# Patient Record
Sex: MALE | Race: NATIVE HAWAIIAN OR OTHER PACIFIC ISLANDER | NOT HISPANIC OR LATINO | Employment: FULL TIME | URBAN - METROPOLITAN AREA
[De-identification: names, ages, dates, MRNs, and addresses within clinical notes are randomized per-mention and may not be internally consistent; named-entity substitution may affect disease eponyms.]

---

## 2019-01-09 ENCOUNTER — HOSPITAL ENCOUNTER (OUTPATIENT)
Dept: LAB | Facility: MEDICAL CENTER | Age: 29
End: 2019-01-09
Attending: FAMILY MEDICINE

## 2019-01-09 LAB
ALBUMIN SERPL BCP-MCNC: 4.2 G/DL (ref 3.2–4.9)
ALBUMIN/GLOB SERPL: 1.3 G/DL
ALP SERPL-CCNC: 71 U/L (ref 30–99)
ALT SERPL-CCNC: 29 U/L (ref 2–50)
ANION GAP SERPL CALC-SCNC: 7 MMOL/L (ref 0–11.9)
AST SERPL-CCNC: 27 U/L (ref 12–45)
BASOPHILS # BLD AUTO: 0.8 % (ref 0–1.8)
BASOPHILS # BLD: 0.05 K/UL (ref 0–0.12)
BILIRUB SERPL-MCNC: 0.8 MG/DL (ref 0.1–1.5)
BUN SERPL-MCNC: 13 MG/DL (ref 8–22)
CALCIUM SERPL-MCNC: 9.8 MG/DL (ref 8.5–10.5)
CHLORIDE SERPL-SCNC: 105 MMOL/L (ref 96–112)
CHOLEST SERPL-MCNC: 161 MG/DL (ref 100–199)
CO2 SERPL-SCNC: 29 MMOL/L (ref 20–33)
CREAT SERPL-MCNC: 1.09 MG/DL (ref 0.5–1.4)
EOSINOPHIL # BLD AUTO: 0.22 K/UL (ref 0–0.51)
EOSINOPHIL NFR BLD: 3.7 % (ref 0–6.9)
ERYTHROCYTE [DISTWIDTH] IN BLOOD BY AUTOMATED COUNT: 40.5 FL (ref 35.9–50)
EST. AVERAGE GLUCOSE BLD GHB EST-MCNC: 126 MG/DL
FASTING STATUS PATIENT QL REPORTED: NORMAL
GLOBULIN SER CALC-MCNC: 3.3 G/DL (ref 1.9–3.5)
GLUCOSE SERPL-MCNC: 109 MG/DL (ref 65–99)
HBA1C MFR BLD: 6 % (ref 0–5.6)
HCT VFR BLD AUTO: 48.1 % (ref 42–52)
HDLC SERPL-MCNC: 37 MG/DL
HGB BLD-MCNC: 16.2 G/DL (ref 14–18)
IMM GRANULOCYTES # BLD AUTO: 0.02 K/UL (ref 0–0.11)
IMM GRANULOCYTES NFR BLD AUTO: 0.3 % (ref 0–0.9)
LDLC SERPL CALC-MCNC: 97 MG/DL
LYMPHOCYTES # BLD AUTO: 1.97 K/UL (ref 1–4.8)
LYMPHOCYTES NFR BLD: 33.3 % (ref 22–41)
MCH RBC QN AUTO: 30.6 PG (ref 27–33)
MCHC RBC AUTO-ENTMCNC: 33.7 G/DL (ref 33.7–35.3)
MCV RBC AUTO: 90.8 FL (ref 81.4–97.8)
MONOCYTES # BLD AUTO: 0.49 K/UL (ref 0–0.85)
MONOCYTES NFR BLD AUTO: 8.3 % (ref 0–13.4)
NEUTROPHILS # BLD AUTO: 3.16 K/UL (ref 1.82–7.42)
NEUTROPHILS NFR BLD: 53.6 % (ref 44–72)
NRBC # BLD AUTO: 0 K/UL
NRBC BLD-RTO: 0 /100 WBC
PLATELET # BLD AUTO: 303 K/UL (ref 164–446)
PMV BLD AUTO: 9.6 FL (ref 9–12.9)
POTASSIUM SERPL-SCNC: 3.9 MMOL/L (ref 3.6–5.5)
PROT SERPL-MCNC: 7.5 G/DL (ref 6–8.2)
RBC # BLD AUTO: 5.3 M/UL (ref 4.7–6.1)
SODIUM SERPL-SCNC: 141 MMOL/L (ref 135–145)
TRIGL SERPL-MCNC: 133 MG/DL (ref 0–149)
WBC # BLD AUTO: 5.9 K/UL (ref 4.8–10.8)

## 2019-01-09 PROCEDURE — 80061 LIPID PANEL: CPT

## 2019-01-09 PROCEDURE — 36415 COLL VENOUS BLD VENIPUNCTURE: CPT

## 2019-01-09 PROCEDURE — 85025 COMPLETE CBC W/AUTO DIFF WBC: CPT

## 2019-01-09 PROCEDURE — 83036 HEMOGLOBIN GLYCOSYLATED A1C: CPT

## 2019-01-09 PROCEDURE — 80053 COMPREHEN METABOLIC PANEL: CPT

## 2021-05-20 ENCOUNTER — HOSPITAL ENCOUNTER (OUTPATIENT)
Dept: CARDIOLOGY | Facility: MEDICAL CENTER | Age: 31
End: 2021-05-20
Attending: FAMILY MEDICINE

## 2021-05-20 LAB — EKG IMPRESSION: NORMAL

## 2021-05-20 PROCEDURE — 93010 ELECTROCARDIOGRAM REPORT: CPT | Performed by: INTERNAL MEDICINE

## 2021-05-20 PROCEDURE — 93005 ELECTROCARDIOGRAM TRACING: CPT

## 2022-05-15 ENCOUNTER — OFFICE VISIT (OUTPATIENT)
Dept: URGENT CARE | Facility: PHYSICIAN GROUP | Age: 32
End: 2022-05-15
Payer: COMMERCIAL

## 2022-05-15 VITALS
HEIGHT: 70 IN | OXYGEN SATURATION: 96 % | DIASTOLIC BLOOD PRESSURE: 80 MMHG | HEART RATE: 86 BPM | SYSTOLIC BLOOD PRESSURE: 124 MMHG | BODY MASS INDEX: 31.5 KG/M2 | WEIGHT: 220 LBS | TEMPERATURE: 98.4 F | RESPIRATION RATE: 18 BRPM

## 2022-05-15 DIAGNOSIS — R10.31 RIGHT GROIN PAIN: ICD-10-CM

## 2022-05-15 LAB
APPEARANCE UR: CLEAR
BILIRUB UR STRIP-MCNC: NEGATIVE MG/DL
COLOR UR AUTO: NORMAL
GLUCOSE UR STRIP.AUTO-MCNC: NEGATIVE MG/DL
KETONES UR STRIP.AUTO-MCNC: NORMAL MG/DL
LEUKOCYTE ESTERASE UR QL STRIP.AUTO: NEGATIVE
NITRITE UR QL STRIP.AUTO: NEGATIVE
PH UR STRIP.AUTO: 7 [PH] (ref 5–8)
PROT UR QL STRIP: 30 MG/DL
RBC UR QL AUTO: NEGATIVE
SP GR UR STRIP.AUTO: 1.02
UROBILINOGEN UR STRIP-MCNC: 0.2 MG/DL

## 2022-05-15 PROCEDURE — 81002 URINALYSIS NONAUTO W/O SCOPE: CPT | Performed by: PHYSICIAN ASSISTANT

## 2022-05-15 PROCEDURE — 99203 OFFICE O/P NEW LOW 30 MIN: CPT | Performed by: PHYSICIAN ASSISTANT

## 2022-05-15 RX ORDER — ACETAMINOPHEN 500 MG
500-1000 TABLET ORAL EVERY 6 HOURS PRN
COMMUNITY
End: 2024-03-01

## 2022-05-15 ASSESSMENT — ENCOUNTER SYMPTOMS
BACK PAIN: 0
CHANGE IN BOWEL HABIT: 0
CONSTIPATION: 0
BLOOD IN STOOL: 0
ABDOMINAL PAIN: 0
SORE THROAT: 0
FEVER: 0
VOMITING: 0
DIARRHEA: 0
FLANK PAIN: 0
ANOREXIA: 0
NAUSEA: 0
CHILLS: 0

## 2022-05-16 ENCOUNTER — HOSPITAL ENCOUNTER (OUTPATIENT)
Dept: RADIOLOGY | Facility: MEDICAL CENTER | Age: 32
End: 2022-05-16
Attending: PHYSICIAN ASSISTANT
Payer: COMMERCIAL

## 2022-05-16 DIAGNOSIS — R10.31 RIGHT GROIN PAIN: ICD-10-CM

## 2022-05-16 PROCEDURE — 76857 US EXAM PELVIC LIMITED: CPT

## 2022-05-16 NOTE — PROGRESS NOTES
Subjective     Leeann Powell is a 32 y.o. male who presents with Groin Pain (X 2 weeks with discomfort on (R) side)      Groin Pain  This is a new problem. The current episode started 1 to 4 weeks ago (Started 2 weeks ago). The problem occurs intermittently. The problem has been unchanged. Pertinent negatives include no abdominal pain, anorexia, change in bowel habit, chills, fever, nausea, sore throat, urinary symptoms or vomiting. Nothing aggravates the symptoms. He has tried nothing for the symptoms.   Patient reports that it is more of a sensation of discomfort rather than pain.  No urinary symptoms.  States that he is in a monogamous relationship and has no concern for STDs.  Pain does not radiate into his testicles.  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  Pain does not seem to get any worse with lifting.    Review of Systems   Constitutional: Negative for chills and fever.   HENT: Negative for sore throat.    Gastrointestinal: Negative for abdominal pain, anorexia, blood in stool, change in bowel habit, constipation, diarrhea, melena, nausea and vomiting.   Genitourinary: Negative for dysuria, flank pain, frequency, hematuria and urgency.        Right groin pain   Musculoskeletal: Negative for back pain.         PMH:  has a past medical history of Anxiety and Pneumonia.  MEDS:   Current Outpatient Medications:   •  acetaminophen (TYLENOL) 500 MG Tab, Take 500-1,000 mg by mouth as needed in the morning and 500-1,000 mg as needed at noon and 500-1,000 mg as needed in the evening and 500-1,000 mg as needed before bedtime., Disp: , Rfl:   ALLERGIES:   Allergies   Allergen Reactions   • Nkda [No Known Drug Allergy]      SURGHX: History reviewed. No pertinent surgical history.  SOCHX:  reports that he does not drink alcohol and does not use drugs.  FH: Family history was reviewed, no pertinent findings to  "report      Objective     /80 (BP Location: Left arm, Patient Position: Sitting, BP Cuff Size: Large adult)   Pulse 86   Temp 36.9 °C (98.4 °F) (Temporal)   Resp 18   Ht 1.778 m (5' 10\")   Wt 99.8 kg (220 lb)   SpO2 96%   BMI 31.57 kg/m²      Physical Exam  Exam conducted with a chaperone present.   Constitutional:       Appearance: Normal appearance. He is well-developed.   HENT:      Head: Normocephalic and atraumatic.      Right Ear: External ear normal.      Left Ear: External ear normal.   Eyes:      Conjunctiva/sclera: Conjunctivae normal.      Pupils: Pupils are equal, round, and reactive to light.   Cardiovascular:      Rate and Rhythm: Normal rate and regular rhythm.      Heart sounds: No murmur heard.  Pulmonary:      Effort: Pulmonary effort is normal.      Breath sounds: Normal breath sounds.   Abdominal:      Palpations: Abdomen is soft.      Tenderness: There is no abdominal tenderness. There is no right CVA tenderness or left CVA tenderness.      Hernia: There is no hernia in the left inguinal area or right inguinal area.   Genitourinary:     Penis: Normal.       Testes: Normal.         Right: Tenderness or swelling not present.         Left: Tenderness or swelling not present.      Epididymis:      Right: Normal.      Left: Normal.          Comments: Patient notes pain in the right groin region.  There is no overlying erythema or skin discoloration of any kind.  No noticeable swelling or bulge.  No obvious evidence of hernia on exam.  No tenderness with palpation of the area but patient did note some discomfort about 30 seconds after palpating that region.  Lymphadenopathy:      Lower Body: No right inguinal adenopathy. No left inguinal adenopathy.   Skin:     General: Skin is warm and dry.      Capillary Refill: Capillary refill takes less than 2 seconds.   Neurological:      Mental Status: He is alert and oriented to person, place, and time.   Psychiatric:         Behavior: Behavior " normal.         Judgment: Judgment normal.       POCT Urinalysis  Lab Results   Component Value Date/Time    POCCOLOR Smiley 05/15/2022 04:47 PM    POCAPPEAR Clear 05/15/2022 04:47 PM    POCLEUKEST Negative 05/15/2022 04:47 PM    POCNITRITE Negative 05/15/2022 04:47 PM    POCUROBILIGE 0.2 05/15/2022 04:47 PM    POCPROTEIN 30 05/15/2022 04:47 PM    POCURPH 7.0 05/15/2022 04:47 PM    POCBLOOD Negative 05/15/2022 04:47 PM    POCSPGRV 1.025 05/15/2022 04:47 PM    POCKETONES Trace 05/15/2022 04:47 PM    POCBILIRUBIN Negative 05/15/2022 04:47 PM    POCGLUCUA Negative 05/15/2022 04:47 PM        Assessment & Plan     1. Right groin pain  - US-INGUINAL HERNIA; Future  - POCT Urinalysis      Will obtain an ultrasound to further evaluate for possibility of inguinal hernia.  Ultrasound scheduled for tomorrow morning.  Will notify patient once I receive results.            Differential Diagnosis, natural history, and supportive care discussed. Return to the Urgent Care or follow up with your PCP if symptoms fail to resolve, or for any new or worsening symptoms. Emergency room precautions discussed. Patient and/or family appears understanding of information.

## 2022-06-13 ENCOUNTER — HOSPITAL ENCOUNTER (OUTPATIENT)
Dept: RADIOLOGY | Facility: MEDICAL CENTER | Age: 32
End: 2022-06-13
Attending: NURSE PRACTITIONER
Payer: COMMERCIAL

## 2022-06-13 ENCOUNTER — OFFICE VISIT (OUTPATIENT)
Dept: URGENT CARE | Facility: PHYSICIAN GROUP | Age: 32
End: 2022-06-13
Payer: COMMERCIAL

## 2022-06-13 VITALS
HEIGHT: 70 IN | SYSTOLIC BLOOD PRESSURE: 116 MMHG | DIASTOLIC BLOOD PRESSURE: 70 MMHG | BODY MASS INDEX: 31.5 KG/M2 | WEIGHT: 220 LBS | TEMPERATURE: 97.1 F | HEART RATE: 99 BPM | OXYGEN SATURATION: 97 % | RESPIRATION RATE: 16 BRPM

## 2022-06-13 DIAGNOSIS — N50.811 PAIN IN RIGHT TESTICLE: ICD-10-CM

## 2022-06-13 LAB
APPEARANCE UR: CLEAR
BILIRUB UR STRIP-MCNC: NEGATIVE MG/DL
COLOR UR AUTO: YELLOW
GLUCOSE UR STRIP.AUTO-MCNC: NEGATIVE MG/DL
KETONES UR STRIP.AUTO-MCNC: NEGATIVE MG/DL
LEUKOCYTE ESTERASE UR QL STRIP.AUTO: NEGATIVE
NITRITE UR QL STRIP.AUTO: NEGATIVE
PH UR STRIP.AUTO: 6.5 [PH] (ref 5–8)
PROT UR QL STRIP: NEGATIVE MG/DL
RBC UR QL AUTO: NEGATIVE
SP GR UR STRIP.AUTO: 1.01
UROBILINOGEN UR STRIP-MCNC: 0.2 MG/DL

## 2022-06-13 PROCEDURE — 76870 US EXAM SCROTUM: CPT

## 2022-06-13 PROCEDURE — 99213 OFFICE O/P EST LOW 20 MIN: CPT | Performed by: NURSE PRACTITIONER

## 2022-06-13 PROCEDURE — 81002 URINALYSIS NONAUTO W/O SCOPE: CPT | Performed by: NURSE PRACTITIONER

## 2022-06-13 ASSESSMENT — PAIN SCALES - GENERAL: PAINLEVEL: 4=SLIGHT-MODERATE PAIN

## 2022-09-20 ENCOUNTER — HOSPITAL ENCOUNTER (OUTPATIENT)
Dept: LAB | Facility: MEDICAL CENTER | Age: 32
End: 2022-09-20
Attending: UROLOGY
Payer: COMMERCIAL

## 2022-09-20 LAB — FSH SERPL-ACNC: 20.4 MIU/ML (ref 1.5–12.4)

## 2022-09-20 PROCEDURE — 88262 CHROMOSOME ANALYSIS 15-20: CPT

## 2022-09-20 PROCEDURE — 36415 COLL VENOUS BLD VENIPUNCTURE: CPT

## 2022-09-20 PROCEDURE — 83001 ASSAY OF GONADOTROPIN (FSH): CPT

## 2022-09-20 PROCEDURE — 84270 ASSAY OF SEX HORMONE GLOBUL: CPT

## 2022-09-20 PROCEDURE — 84402 ASSAY OF FREE TESTOSTERONE: CPT

## 2022-09-20 PROCEDURE — 88230 TISSUE CULTURE LYMPHOCYTE: CPT

## 2022-09-20 PROCEDURE — 84403 ASSAY OF TOTAL TESTOSTERONE: CPT

## 2022-09-20 PROCEDURE — 81403 MOPATH PROCEDURE LEVEL 4: CPT

## 2022-09-24 LAB
SHBG SERPL-SCNC: 15 NMOL/L (ref 17–56)
TESTOST FREE MFR SERPL: 2.4 % (ref 1.6–2.9)
TESTOST FREE SERPL-MCNC: 33 PG/ML (ref 47–244)
TESTOST SERPL-MCNC: 138 NG/DL (ref 300–1080)

## 2022-09-30 LAB
KARYOTYP BLD/T: NORMAL
PATHOLOGY STUDY: NORMAL

## 2022-10-15 LAB — TEST NAME 95000: NORMAL

## 2022-10-18 ENCOUNTER — HOSPITAL ENCOUNTER (OUTPATIENT)
Facility: MEDICAL CENTER | Age: 32
End: 2022-10-18
Attending: UROLOGY
Payer: COMMERCIAL

## 2022-10-18 PROCEDURE — 82306 VITAMIN D 25 HYDROXY: CPT

## 2022-10-18 PROCEDURE — 83002 ASSAY OF GONADOTROPIN (LH): CPT

## 2022-10-18 PROCEDURE — 82310 ASSAY OF CALCIUM: CPT

## 2022-10-19 LAB
25(OH)D3 SERPL-MCNC: 20 NG/ML (ref 30–100)
CALCIUM SERPL-MCNC: 9.6 MG/DL (ref 8.5–10.5)
LH SERPL-ACNC: 20 IU/L (ref 1.7–8.6)

## 2023-06-02 ENCOUNTER — OFFICE VISIT (OUTPATIENT)
Dept: URGENT CARE | Facility: PHYSICIAN GROUP | Age: 33
End: 2023-06-02
Payer: COMMERCIAL

## 2023-06-02 VITALS
HEART RATE: 72 BPM | BODY MASS INDEX: 31.35 KG/M2 | WEIGHT: 219 LBS | OXYGEN SATURATION: 97 % | DIASTOLIC BLOOD PRESSURE: 70 MMHG | SYSTOLIC BLOOD PRESSURE: 110 MMHG | RESPIRATION RATE: 18 BRPM | HEIGHT: 70 IN | TEMPERATURE: 97.3 F

## 2023-06-02 DIAGNOSIS — M25.531 RIGHT WRIST PAIN: ICD-10-CM

## 2023-06-02 PROCEDURE — 99213 OFFICE O/P EST LOW 20 MIN: CPT

## 2023-06-02 PROCEDURE — 3078F DIAST BP <80 MM HG: CPT

## 2023-06-02 PROCEDURE — 3074F SYST BP LT 130 MM HG: CPT

## 2023-06-03 PROBLEM — N50.819 PAIN IN TESTICLE: Status: ACTIVE | Noted: 2022-09-15

## 2023-06-03 PROBLEM — Q98.4 KLINEFELTER SYNDROME: Status: ACTIVE | Noted: 2023-04-03

## 2023-06-03 PROBLEM — N46.01 AZOOSPERMIA: Status: ACTIVE | Noted: 2022-09-15

## 2023-06-03 NOTE — PROGRESS NOTES
"Subjective:   Leeann Powell is a 33 y.o. male who presents for Wrist Injury (X 2 weeks, px in r wrist, popping sound when turning wrist, px when pulling on items)      HPI:    Patient presents to urgent care with concerns of persistent right wrist pain  Reports he injured the wrist two weeks ago while lifting heavy boxes   States he has a popping sound in his wrist when he turns it  He rates pain as 2-3 /10  Pain is aggravated with pulling and twisting maneuvers, wrist flexion.  Pain has been controlled with NSAIDs  Denies radiation of pain  Denies numbness/tingling sensation, weakness  He has been wearing a brace which has been helpful      ROS As above in HPI    Medications:    Current Outpatient Medications on File Prior to Visit   Medication Sig Dispense Refill    acetaminophen (TYLENOL) 500 MG Tab Take 500-1,000 mg by mouth as needed in the morning and 500-1,000 mg as needed at noon and 500-1,000 mg as needed in the evening and 500-1,000 mg as needed before bedtime.      clomiPHENE (CLOMID) 50 MG tablet clomiphene citrate 50 mg tablet   Take 0.5 tablets every day by oral route for 90 days.       No current facility-administered medications on file prior to visit.        Allergies:   Nkda [no known drug allergy]    Problem List:   Patient Active Problem List   Diagnosis    Azoospermia    Hypogonadism    Klinefelter syndrome    Pain in testicle        Surgical History:  No past surgical history on file.    Past Social Hx:   Social History     Tobacco Use    Smoking status: Never    Smokeless tobacco: Never   Vaping Use    Vaping Use: Never used   Substance Use Topics    Alcohol use: Yes    Drug use: No          Problem list, medications, and allergies reviewed by myself today in Epic.     Objective:     /70   Pulse 72   Temp 36.3 °C (97.3 °F) (Temporal)   Resp 18   Ht 1.778 m (5' 10\")   Wt 99.3 kg (219 lb)   SpO2 97%   BMI 31.42 kg/m²     Physical Exam  Vitals and nursing note reviewed. "   Constitutional:       Appearance: Normal appearance.   Cardiovascular:      Rate and Rhythm: Normal rate and regular rhythm.      Heart sounds: Normal heart sounds.   Pulmonary:      Effort: Pulmonary effort is normal.      Breath sounds: Normal breath sounds.   Musculoskeletal:         General: Tenderness present.      Comments: Dorsum of right medial wrist is tender to palpation. No obvious deformity, dislocation. No tenderness to right elbow, shoulder.  strength is 5/5. Pain is provoked with active and passive flexion of wrist, pronation/supination of forearm. Sensation is intact to light and sharp touch, cap refill is less than 2 seconds, 2+ radial pulse. No edema, erythema, warmth, fluctuance, ecchymosis. No rash.   Skin:     General: Skin is warm.      Capillary Refill: Capillary refill takes less than 2 seconds.   Neurological:      Mental Status: He is oriented to person, place, and time.         Assessment/Plan:     Diagnosis and associated orders:   1. Right wrist pain  - Referral to Sports Medicine       Comments/MDM:     Continue to wear brace, use NSAIDs/Tylenol per package instructions, application of ice packs as needed.   Referral to sports med placed due to persistent wrist pain  Imaging declined today  Follow up with PCP       Please note that this dictation was created using voice recognition software. I have made a reasonable attempt to correct obvious errors, but I expect that there are errors of grammar and possibly content that I did not discover before finalizing the note.    This note was electronically signed by TEMI Hendrickson

## 2023-06-14 SDOH — ECONOMIC STABILITY: TRANSPORTATION INSECURITY
IN THE PAST 12 MONTHS, HAS LACK OF RELIABLE TRANSPORTATION KEPT YOU FROM MEDICAL APPOINTMENTS, MEETINGS, WORK OR FROM GETTING THINGS NEEDED FOR DAILY LIVING?: PATIENT DECLINED

## 2023-06-14 SDOH — ECONOMIC STABILITY: HOUSING INSECURITY
IN THE LAST 12 MONTHS, WAS THERE A TIME WHEN YOU DID NOT HAVE A STEADY PLACE TO SLEEP OR SLEPT IN A SHELTER (INCLUDING NOW)?: PATIENT REFUSED

## 2023-06-14 SDOH — HEALTH STABILITY: MENTAL HEALTH
STRESS IS WHEN SOMEONE FEELS TENSE, NERVOUS, ANXIOUS, OR CAN'T SLEEP AT NIGHT BECAUSE THEIR MIND IS TROUBLED. HOW STRESSED ARE YOU?: PATIENT DECLINED

## 2023-06-14 SDOH — ECONOMIC STABILITY: TRANSPORTATION INSECURITY
IN THE PAST 12 MONTHS, HAS THE LACK OF TRANSPORTATION KEPT YOU FROM MEDICAL APPOINTMENTS OR FROM GETTING MEDICATIONS?: PATIENT DECLINED

## 2023-06-14 SDOH — ECONOMIC STABILITY: INCOME INSECURITY: IN THE LAST 12 MONTHS, WAS THERE A TIME WHEN YOU WERE NOT ABLE TO PAY THE MORTGAGE OR RENT ON TIME?: PATIENT REFUSED

## 2023-06-14 SDOH — HEALTH STABILITY: PHYSICAL HEALTH
ON AVERAGE, HOW MANY DAYS PER WEEK DO YOU ENGAGE IN MODERATE TO STRENUOUS EXERCISE (LIKE A BRISK WALK)?: PATIENT DECLINED

## 2023-06-14 SDOH — ECONOMIC STABILITY: INCOME INSECURITY: HOW HARD IS IT FOR YOU TO PAY FOR THE VERY BASICS LIKE FOOD, HOUSING, MEDICAL CARE, AND HEATING?: PATIENT DECLINED

## 2023-06-14 SDOH — HEALTH STABILITY: PHYSICAL HEALTH: ON AVERAGE, HOW MANY MINUTES DO YOU ENGAGE IN EXERCISE AT THIS LEVEL?: PATIENT DECLINED

## 2023-06-14 SDOH — ECONOMIC STABILITY: FOOD INSECURITY: WITHIN THE PAST 12 MONTHS, YOU WORRIED THAT YOUR FOOD WOULD RUN OUT BEFORE YOU GOT MONEY TO BUY MORE.: PATIENT DECLINED

## 2023-06-14 SDOH — ECONOMIC STABILITY: HOUSING INSECURITY: IN THE LAST 12 MONTHS, HOW MANY PLACES HAVE YOU LIVED?: 1

## 2023-06-14 SDOH — ECONOMIC STABILITY: TRANSPORTATION INSECURITY
IN THE PAST 12 MONTHS, HAS LACK OF TRANSPORTATION KEPT YOU FROM MEETINGS, WORK, OR FROM GETTING THINGS NEEDED FOR DAILY LIVING?: PATIENT DECLINED

## 2023-06-14 SDOH — ECONOMIC STABILITY: FOOD INSECURITY: WITHIN THE PAST 12 MONTHS, THE FOOD YOU BOUGHT JUST DIDN'T LAST AND YOU DIDN'T HAVE MONEY TO GET MORE.: PATIENT DECLINED

## 2023-06-14 ASSESSMENT — SOCIAL DETERMINANTS OF HEALTH (SDOH)
DO YOU BELONG TO ANY CLUBS OR ORGANIZATIONS SUCH AS CHURCH GROUPS UNIONS, FRATERNAL OR ATHLETIC GROUPS, OR SCHOOL GROUPS?: PATIENT DECLINED
ARE YOU MARRIED, WIDOWED, DIVORCED, SEPARATED, NEVER MARRIED, OR LIVING WITH A PARTNER?: PATIENT DECLINED
HOW HARD IS IT FOR YOU TO PAY FOR THE VERY BASICS LIKE FOOD, HOUSING, MEDICAL CARE, AND HEATING?: PATIENT DECLINED
WITHIN THE PAST 12 MONTHS, YOU WORRIED THAT YOUR FOOD WOULD RUN OUT BEFORE YOU GOT THE MONEY TO BUY MORE: PATIENT DECLINED
HOW OFTEN DO YOU GET TOGETHER WITH FRIENDS OR RELATIVES?: PATIENT DECLINED
ARE YOU MARRIED, WIDOWED, DIVORCED, SEPARATED, NEVER MARRIED, OR LIVING WITH A PARTNER?: PATIENT DECLINED
HOW OFTEN DO YOU ATTENT MEETINGS OF THE CLUB OR ORGANIZATION YOU BELONG TO?: PATIENT DECLINED
HOW OFTEN DO YOU ATTEND CHURCH OR RELIGIOUS SERVICES?: PATIENT DECLINED
HOW OFTEN DO YOU HAVE SIX OR MORE DRINKS ON ONE OCCASION: PATIENT DECLINED
IN A TYPICAL WEEK, HOW MANY TIMES DO YOU TALK ON THE PHONE WITH FAMILY, FRIENDS, OR NEIGHBORS?: PATIENT DECLINED
IN A TYPICAL WEEK, HOW MANY TIMES DO YOU TALK ON THE PHONE WITH FAMILY, FRIENDS, OR NEIGHBORS?: PATIENT DECLINED
HOW OFTEN DO YOU GET TOGETHER WITH FRIENDS OR RELATIVES?: PATIENT DECLINED
HOW OFTEN DO YOU ATTEND CHURCH OR RELIGIOUS SERVICES?: PATIENT DECLINED
HOW MANY DRINKS CONTAINING ALCOHOL DO YOU HAVE ON A TYPICAL DAY WHEN YOU ARE DRINKING: PATIENT DECLINED
HOW OFTEN DO YOU HAVE A DRINK CONTAINING ALCOHOL: PATIENT DECLINED
HOW OFTEN DO YOU ATTENT MEETINGS OF THE CLUB OR ORGANIZATION YOU BELONG TO?: PATIENT DECLINED
DO YOU BELONG TO ANY CLUBS OR ORGANIZATIONS SUCH AS CHURCH GROUPS UNIONS, FRATERNAL OR ATHLETIC GROUPS, OR SCHOOL GROUPS?: PATIENT DECLINED

## 2023-06-14 ASSESSMENT — LIFESTYLE VARIABLES
SKIP TO QUESTIONS 9-10: 0
HOW MANY STANDARD DRINKS CONTAINING ALCOHOL DO YOU HAVE ON A TYPICAL DAY: PATIENT DECLINED
HOW OFTEN DO YOU HAVE SIX OR MORE DRINKS ON ONE OCCASION: PATIENT DECLINED
HOW OFTEN DO YOU HAVE A DRINK CONTAINING ALCOHOL: PATIENT DECLINED
AUDIT-C TOTAL SCORE: -1

## 2023-06-15 ENCOUNTER — OFFICE VISIT (OUTPATIENT)
Dept: SPORTS MEDICINE | Facility: CLINIC | Age: 33
End: 2023-06-15
Payer: COMMERCIAL

## 2023-06-15 ENCOUNTER — APPOINTMENT (OUTPATIENT)
Dept: RADIOLOGY | Facility: IMAGING CENTER | Age: 33
End: 2023-06-15
Attending: FAMILY MEDICINE
Payer: COMMERCIAL

## 2023-06-15 VITALS
DIASTOLIC BLOOD PRESSURE: 64 MMHG | SYSTOLIC BLOOD PRESSURE: 112 MMHG | TEMPERATURE: 98.1 F | BODY MASS INDEX: 31.35 KG/M2 | WEIGHT: 219 LBS | RESPIRATION RATE: 16 BRPM | HEART RATE: 88 BPM | HEIGHT: 70 IN | OXYGEN SATURATION: 95 %

## 2023-06-15 DIAGNOSIS — M25.531 ACUTE PAIN OF RIGHT WRIST: ICD-10-CM

## 2023-06-15 DIAGNOSIS — Q74.0 CONGENITAL POSITIVE ULNAR VARIANCE OF RIGHT WRIST: ICD-10-CM

## 2023-06-15 PROCEDURE — 73110 X-RAY EXAM OF WRIST: CPT | Mod: TC,RT | Performed by: RADIOLOGY

## 2023-06-15 PROCEDURE — 99213 OFFICE O/P EST LOW 20 MIN: CPT | Performed by: FAMILY MEDICINE

## 2023-06-15 PROCEDURE — 3078F DIAST BP <80 MM HG: CPT | Performed by: FAMILY MEDICINE

## 2023-06-15 PROCEDURE — 3074F SYST BP LT 130 MM HG: CPT | Performed by: FAMILY MEDICINE

## 2023-06-15 ASSESSMENT — ENCOUNTER SYMPTOMS
NAUSEA: 0
CHILLS: 0
VOMITING: 0
SHORTNESS OF BREATH: 0
FEVER: 0
DIZZINESS: 0

## 2023-06-15 NOTE — Clinical Note
Ortiz Gonzalez, Thank you for referring Leeann to the sports medicine clinic. Fortunately, his pain is MUCH BETTER. He seems to be responding nicely to night splint.  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 We recommended some exercises and guided him on weaning from his bracing in the next few weeks. Since he is doing so well we will have him follow-up on an as-needed basis. Hope you are well! Respectfully,  KATHERIN Lyon M.D. [facility] [phone]

## 2023-06-15 NOTE — PROGRESS NOTES
Chief Complaint   Patient presents with    Wrist Pain     Right wrist pain        Subjective     Referred by Lisa Atkins D.N.P.  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  Pain is predominantly along the dorsal ulnar aspect of the RIGHT wrist  Pain was achy  No radiation  Improved with rest and immobilization as well as bracing  Worse with use of the wrist such as pulling or lifting something heavy  Overall IMPROVING  No night symptoms, he is using his wrist splint at night which also seems to be helping  Intermittently taking Tylenol for pain which helps some  POSITIVE prior history of wrist fracture when he was a child on the RIGHT side    Warehouse fulfillment,   Likes fishing and rockclimbing    Review of Systems   Constitutional:  Negative for chills and fever.   Respiratory:  Negative for shortness of breath.    Cardiovascular:  Negative for chest pain.   Gastrointestinal:  Negative for nausea and vomiting.   Neurological:  Negative for dizziness.     PMH:  has a past medical history of Anxiety and Pneumonia.  MEDS:   Current Outpatient Medications:     clomiPHENE (CLOMID) 50 MG tablet, clomiphene citrate 50 mg tablet  Take 0.5 tablets every day by oral route for 90 days., Disp: , Rfl:     acetaminophen (TYLENOL) 500 MG Tab, Take 500-1,000 mg by mouth as needed in the morning and 500-1,000 mg as needed at noon and 500-1,000 mg as needed in the evening and 500-1,000 mg as needed before bedtime., Disp: , Rfl:   ALLERGIES:   Allergies   Allergen Reactions    Nkda [No Known Drug Allergy]      SURGHX: History reviewed. No pertinent surgical history.  SOCHX:  reports that he has never smoked. He has never used smokeless tobacco. He reports current alcohol use. He reports that he does not use drugs.  FH: Family history was reviewed, no pertinent findings  "to report    Objective   /64 (BP Location: Left arm, Patient Position: Sitting, BP Cuff Size: Adult)   Pulse 88   Temp 36.7 °C (98.1 °F) (Temporal)   Resp 16   Ht 1.778 m (5' 10\")   Wt 99.3 kg (219 lb)   SpO2 95%   BMI 31.42 kg/m²     Hand exam    NAD  Alert and oriented    BILATERAL ELBOW exam  Range of motion intact  No swelling  No tenderness the medial or lateral epicondyle  Rashid test NEGATIVE    BILATERAL WRIST exam  Range of motion intact  No tenderness along scaphoid, TFCC insertion, distal radius or distal ulna  Tinel's testing is NEGATIVE  The hand is otherwise neurovascularly intact    1. Acute pain of right wrist  DX-WRIST-COMPLETE 3+ RIGHT      2. 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     TECHNIQUE/EXAM DESCRIPTION AND NUMBER OF VIEWS:  4 views of the RIGHT wrist.     COMPARISON: None     FINDINGS:  No focal soft tissue swelling.  Carpal alignment is preserved.  Apparent attenuation of the joint space between the trapezium and trapezoid.  No fracture or dislocation.     IMPRESSION:     1.  No fracture or dislocation of RIGHT wrist.  2.  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         "   Exam Ended: 06/15/23  2:45 PM Last Resulted: 06/15/23  2:57 PM           Interpreted in the office today with the patient    Thank you Lisa Atkins D.N.P. for allowing me to participate in caring for your patient.

## 2024-03-01 ENCOUNTER — OFFICE VISIT (OUTPATIENT)
Dept: URGENT CARE | Facility: PHYSICIAN GROUP | Age: 34
End: 2024-03-01
Payer: COMMERCIAL

## 2024-03-01 VITALS
BODY MASS INDEX: 31.35 KG/M2 | SYSTOLIC BLOOD PRESSURE: 106 MMHG | OXYGEN SATURATION: 96 % | TEMPERATURE: 96.8 F | DIASTOLIC BLOOD PRESSURE: 62 MMHG | WEIGHT: 219 LBS | HEART RATE: 83 BPM | HEIGHT: 70 IN

## 2024-03-01 DIAGNOSIS — J02.9 VIRAL PHARYNGITIS: ICD-10-CM

## 2024-03-01 DIAGNOSIS — H10.9 BACTERIAL CONJUNCTIVITIS: ICD-10-CM

## 2024-03-01 LAB — S PYO DNA SPEC NAA+PROBE: NOT DETECTED

## 2024-03-01 PROCEDURE — 99213 OFFICE O/P EST LOW 20 MIN: CPT

## 2024-03-01 PROCEDURE — 87651 STREP A DNA AMP PROBE: CPT

## 2024-03-01 PROCEDURE — 3078F DIAST BP <80 MM HG: CPT

## 2024-03-01 PROCEDURE — 3074F SYST BP LT 130 MM HG: CPT

## 2024-03-01 RX ORDER — POLYMYXIN B SULFATE AND TRIMETHOPRIM 1; 10000 MG/ML; [USP'U]/ML
1 SOLUTION OPHTHALMIC EVERY 4 HOURS
Qty: 10 ML | Refills: 0 | Status: SHIPPED | OUTPATIENT
Start: 2024-03-01 | End: 2024-03-11

## 2024-03-01 NOTE — LETTER
March 1, 2024    To Whom It May Concern:         This is confirmation that Leeann Powell attended his scheduled appointment with MACO Adam on 3/01/24.         If you have any questions please do not hesitate to call me at the phone number listed below.    Sincerely,          VOLODYMYR Adam.  [phone]

## 2024-03-01 NOTE — PROGRESS NOTES
"Chief Complaint   Patient presents with    Pharyngitis     X1 week    Pink Eye     X4-5 days    Congestion         Subjective:   HISTORY OF PRESENT ILLNESS: Leeann Powell is a 34 y.o. male who presents for sore throat, mild dry cough.  He now woke up with his right eye crusted shut this morning and has had continued drainage from the right eye.     Patient denies contact use    Medications, Allergies, current problem list, Social and Family history reviewed today in Epic.     Objective:     /62 (BP Location: Left arm, Patient Position: Sitting, BP Cuff Size: Large adult)   Pulse 83   Temp 36 °C (96.8 °F)   Ht 1.778 m (5' 10\")   Wt 99.3 kg (219 lb)   SpO2 96%     Physical Exam  Vitals reviewed.   Constitutional:       Appearance: Normal appearance. He is not toxic-appearing.   HENT:      Head:      Jaw: No trismus.      Right Ear: Tympanic membrane normal.      Left Ear: Tympanic membrane normal.      Nose: Rhinorrhea present.      Mouth/Throat:      Mouth: Mucous membranes are moist.      Pharynx: Uvula midline. Posterior oropharyngeal erythema present. No pharyngeal swelling, oropharyngeal exudate or uvula swelling.      Tonsils: No tonsillar exudate or tonsillar abscesses. 1+ on the right. 1+ on the left.      Comments: No muffled voice, trismus, unilateral deviation of the uvula, soft palate fullness or edema. No oral airway compromise, or drooling noted.   Eyes:      General:         Right eye: Discharge present.      Conjunctiva/sclera:      Right eye: Right conjunctiva is injected.   Cardiovascular:      Rate and Rhythm: Normal rate and regular rhythm.      Heart sounds: Normal heart sounds.   Pulmonary:      Effort: Pulmonary effort is normal. No respiratory distress.      Breath sounds: Normal breath sounds. No decreased breath sounds or wheezing.   Musculoskeletal:      Cervical back: Full passive range of motion without pain and neck supple.   Skin:     General: Skin is warm and dry. "   Neurological:      Mental Status: He is alert and oriented to person, place, and time.   Psychiatric:         Mood and Affect: Mood normal.          Assessment/Plan:     Diagnosis and associated orders    I personally reviewed prior external notes and test results pertinent to today's visit.     1. Bacterial conjunctivitis  polymixin-trimethoprim (POLYTRIM) 72588-7.1 UNIT/ML-% Solution      2. Viral pharyngitis  POCT CEPHEID GROUP A STREP - PCR        Results for orders placed or performed in visit on 03/01/24   POCT CEPHEID GROUP A STREP - PCR   Result Value Ref Range    POC Group A Strep, PCR Not Detected Not Detected, Invalid        IMPRESSION:  Pt has stable vital signs and no red flag symptoms or exam findings identified.   Informed pt that symptoms are consistent with pinkeye.  Advised to practice strict hand hygiene and washing of pillow cases daily.  May return to school/work when discharge is gone      Differential diagnosis discussed. Pt was Educated on red flag symptoms. Pt has been Instructed to return to Urgent Care or nearest Emergency Department if symptoms fail to improve, for any change in condition, further concerns, or new concerning symptoms. Patient states understanding of the plan of care and discharge instructions.  They are discharged in stable condition.         Please note that this dictation was created using voice recognition software. I have made a reasonable attempt to correct obvious errors, but I expect that there are errors of grammar and possibly content that I did not discover before finalizing the note.    This note was electronically signed by MACO Adam

## 2024-11-11 ENCOUNTER — OFFICE VISIT (OUTPATIENT)
Dept: URGENT CARE | Facility: PHYSICIAN GROUP | Age: 34
End: 2024-11-11
Payer: COMMERCIAL

## 2024-11-11 VITALS
TEMPERATURE: 98.3 F | BODY MASS INDEX: 31.04 KG/M2 | SYSTOLIC BLOOD PRESSURE: 104 MMHG | HEART RATE: 86 BPM | WEIGHT: 216.8 LBS | DIASTOLIC BLOOD PRESSURE: 72 MMHG | OXYGEN SATURATION: 96 % | HEIGHT: 70 IN | RESPIRATION RATE: 12 BRPM

## 2024-11-11 DIAGNOSIS — N63.15 BREAST LUMP ON RIGHT SIDE AT 12 O'CLOCK POSITION: ICD-10-CM

## 2024-11-11 PROCEDURE — 3078F DIAST BP <80 MM HG: CPT

## 2024-11-11 PROCEDURE — 99213 OFFICE O/P EST LOW 20 MIN: CPT

## 2024-11-11 PROCEDURE — 3074F SYST BP LT 130 MM HG: CPT

## 2024-11-11 ASSESSMENT — ENCOUNTER SYMPTOMS
BREAST MASS: 1
DIZZINESS: 0
SWOLLEN GLANDS: 0
VOMITING: 0
DIARRHEA: 0
SHORTNESS OF BREATH: 0
WEAKNESS: 0
COUGH: 0
FEVER: 0
NAUSEA: 0

## 2024-11-11 NOTE — PROGRESS NOTES
"Subjective     Leeann Powell is a 34 y.o. male who presents with Bump (Right side bump above nipple. X1 month)            Leeann is here today accompanied by his wife complaining of a bump above his right nipple.  He first noticed the bump approximately 1 month ago and has not noticed any changes in size.  He notes that it is not painful.  Past medical history is significant for Klinefelter syndrome.    Breast Mass  This is a new problem. The current episode started 1 to 4 weeks ago. Pertinent negatives include no chest pain, congestion, coughing, fever, nausea, rash, swollen glands, vomiting or weakness. Nothing aggravates the symptoms.       Review of Systems   Constitutional:  Negative for fever and malaise/fatigue.   HENT:  Negative for congestion.    Respiratory:  Negative for cough and shortness of breath.    Cardiovascular:  Negative for chest pain.   Gastrointestinal:  Negative for diarrhea, nausea and vomiting.   Skin:  Negative for rash.   Neurological:  Negative for dizziness and weakness.   All other systems reviewed and are negative.             Objective     /72 (BP Location: Right arm, Patient Position: Sitting, BP Cuff Size: Adult)   Pulse 86   Temp 36.8 °C (98.3 °F)   Resp 12   Ht 1.778 m (5' 10\")   Wt 98.3 kg (216 lb 12.8 oz)   SpO2 96%   BMI 31.11 kg/m²      Physical Exam  Vitals reviewed.   Constitutional:       Appearance: Normal appearance.   HENT:      Head: Normocephalic.      Nose: Nose normal.   Eyes:      Conjunctiva/sclera: Conjunctivae normal.   Pulmonary:      Effort: Pulmonary effort is normal.   Chest:   Breasts:     Right: Mass (palpable, hard, pea-sized, immobile lump, in 12 oclock position above right nipple) present. No swelling, nipple discharge, skin change or tenderness.          Comments: [id number] flesh colored lump can be seen on visualization  Musculoskeletal:         General: Normal range of motion.   Skin:     General: Skin is warm and dry. "   Neurological:      Mental Status: He is alert and oriented to person, place, and time.   Psychiatric:         Behavior: Behavior normal.         Judgment: Judgment normal.                             Assessment & Plan        Assessment & Plan  Breast lump on right side at 12 o'clock position    Orders:    Referral to establish with PCP    MA-DIAGNOSTIC MAMMO BILAT W/TOMOSYNTHESIS W/CAD    US-LOCALIZATION BREAST SINGLE RIGHT            Discussed plan for mammogram and ultrasound with patient and his wife who are agreeable to plan.    Mammogram scheduled for 11/26 at 8 AM    Patient told to report back to clinic if any pain develops, nipple discharge, nipple bleeding, or fever.

## 2024-11-26 ENCOUNTER — HOSPITAL ENCOUNTER (OUTPATIENT)
Dept: RADIOLOGY | Facility: MEDICAL CENTER | Age: 34
End: 2024-11-26
Payer: COMMERCIAL

## 2024-11-26 DIAGNOSIS — N63.15 BREAST LUMP ON RIGHT SIDE AT 12 O'CLOCK POSITION: ICD-10-CM

## 2024-11-26 PROCEDURE — 76642 ULTRASOUND BREAST LIMITED: CPT | Mod: RT

## 2024-11-26 PROCEDURE — G0279 TOMOSYNTHESIS, MAMMO: HCPCS
